# Patient Record
Sex: FEMALE | Race: WHITE | NOT HISPANIC OR LATINO | Employment: OTHER | URBAN - METROPOLITAN AREA
[De-identification: names, ages, dates, MRNs, and addresses within clinical notes are randomized per-mention and may not be internally consistent; named-entity substitution may affect disease eponyms.]

---

## 2021-02-24 ENCOUNTER — IMMUNIZATION (OUTPATIENT)
Dept: OBSTETRICS AND GYNECOLOGY | Facility: CLINIC | Age: 66
End: 2021-02-24
Payer: MEDICARE

## 2021-02-24 DIAGNOSIS — Z23 NEED FOR VACCINATION: Primary | ICD-10-CM

## 2021-02-24 PROCEDURE — 91300 COVID-19, MRNA, LNP-S, PF, 30 MCG/0.3 ML DOSE VACCINE: CPT | Mod: PBBFAC | Performed by: NURSE PRACTITIONER

## 2021-03-17 ENCOUNTER — IMMUNIZATION (OUTPATIENT)
Dept: OBSTETRICS AND GYNECOLOGY | Facility: CLINIC | Age: 66
End: 2021-03-17
Payer: MEDICARE

## 2021-03-17 DIAGNOSIS — Z23 NEED FOR VACCINATION: Primary | ICD-10-CM

## 2021-03-17 PROCEDURE — 91300 COVID-19, MRNA, LNP-S, PF, 30 MCG/0.3 ML DOSE VACCINE: CPT | Mod: PBBFAC | Performed by: FAMILY MEDICINE

## 2021-03-17 PROCEDURE — 0002A COVID-19, MRNA, LNP-S, PF, 30 MCG/0.3 ML DOSE VACCINE: CPT | Mod: PBBFAC | Performed by: FAMILY MEDICINE

## 2024-11-14 ENCOUNTER — TELEPHONE (OUTPATIENT)
Dept: NEUROLOGY | Facility: CLINIC | Age: 69
End: 2024-11-14
Payer: MEDICARE

## 2024-11-14 NOTE — TELEPHONE ENCOUNTER
----- Message from Sonia sent at 11/14/2024  3:24 PM CST -----  Regarding: appt  Contact: [phone]  ..Pt requesting NP appt type. Pt returning call from staff in regards to scheduling.  Pls call to better discuss.

## 2024-11-14 NOTE — TELEPHONE ENCOUNTER
----- Message from Anita sent at 11/14/2024  9:54 AM CST -----  Type:  Sooner Apoointment Request    Caller is requesting a sooner appointment.  Caller declined first available appointment listed below.  Caller will not accept being placed on the waitlist and is requesting a message be sent to doctor.  Name of Caller: Pt  When is the first available appointment?  Symptoms: referral, Parkinson  Would the patient rather a call back or a response via [facility]? Call  Best Call Back Number:  [phone]  Additional Information: Pt would like to speak to someone in office for an appt.

## 2024-11-19 ENCOUNTER — TELEPHONE (OUTPATIENT)
Dept: NEUROLOGY | Facility: CLINIC | Age: 69
End: 2024-11-19
Payer: MEDICARE

## 2025-03-17 ENCOUNTER — LAB VISIT (OUTPATIENT)
Dept: LAB | Facility: HOSPITAL | Age: 70
End: 2025-03-17
Attending: STUDENT IN AN ORGANIZED HEALTH CARE EDUCATION/TRAINING PROGRAM
Payer: MEDICARE

## 2025-03-17 ENCOUNTER — OFFICE VISIT (OUTPATIENT)
Facility: CLINIC | Age: 70
End: 2025-03-17
Payer: MEDICARE

## 2025-03-17 ENCOUNTER — RESULTS FOLLOW-UP (OUTPATIENT)
Facility: CLINIC | Age: 70
End: 2025-03-17

## 2025-03-17 VITALS
HEIGHT: 68 IN | BODY MASS INDEX: 25.61 KG/M2 | DIASTOLIC BLOOD PRESSURE: 85 MMHG | WEIGHT: 169 LBS | HEART RATE: 53 BPM | SYSTOLIC BLOOD PRESSURE: 132 MMHG

## 2025-03-17 DIAGNOSIS — R26.9 ABNORMAL GAIT: ICD-10-CM

## 2025-03-17 DIAGNOSIS — E61.1 IRON DEFICIENCY: ICD-10-CM

## 2025-03-17 DIAGNOSIS — E61.1 IRON DEFICIENCY ASSOCIATED WITH FAMILIAL RESTLESS LEGS SYNDROME: ICD-10-CM

## 2025-03-17 DIAGNOSIS — G25.81 FAMILIAL RESTLESS LEGS SYNDROME: ICD-10-CM

## 2025-03-17 DIAGNOSIS — E53.8 B12 DEFICIENCY: ICD-10-CM

## 2025-03-17 DIAGNOSIS — G25.81 IRON DEFICIENCY ASSOCIATED WITH FAMILIAL RESTLESS LEGS SYNDROME: ICD-10-CM

## 2025-03-17 DIAGNOSIS — F32.0 CURRENT MILD EPISODE OF MAJOR DEPRESSIVE DISORDER WITHOUT PRIOR EPISODE: ICD-10-CM

## 2025-03-17 DIAGNOSIS — N39.46 MIXED STRESS AND URGE URINARY INCONTINENCE: ICD-10-CM

## 2025-03-17 DIAGNOSIS — G20.A1 PARKINSON'S DISEASE WITHOUT DYSKINESIA OR FLUCTUATING MANIFESTATIONS: Primary | ICD-10-CM

## 2025-03-17 LAB
FERRITIN SERPL-MCNC: 44 NG/ML (ref 20–300)
FOLATE SERPL-MCNC: 16.4 NG/ML (ref 4–24)
IRON SERPL-MCNC: 72 UG/DL (ref 30–160)
SATURATED IRON: 18 % (ref 20–50)
TOTAL IRON BINDING CAPACITY: 391 UG/DL (ref 250–450)
TRANSFERRIN SERPL-MCNC: 264 MG/DL (ref 200–375)

## 2025-03-17 PROCEDURE — 99204 OFFICE O/P NEW MOD 45 MIN: CPT | Mod: S$PBB,,, | Performed by: STUDENT IN AN ORGANIZED HEALTH CARE EDUCATION/TRAINING PROGRAM

## 2025-03-17 PROCEDURE — 99213 OFFICE O/P EST LOW 20 MIN: CPT | Mod: PBBFAC | Performed by: STUDENT IN AN ORGANIZED HEALTH CARE EDUCATION/TRAINING PROGRAM

## 2025-03-17 PROCEDURE — 82746 ASSAY OF FOLIC ACID SERUM: CPT | Performed by: STUDENT IN AN ORGANIZED HEALTH CARE EDUCATION/TRAINING PROGRAM

## 2025-03-17 PROCEDURE — 84466 ASSAY OF TRANSFERRIN: CPT | Performed by: STUDENT IN AN ORGANIZED HEALTH CARE EDUCATION/TRAINING PROGRAM

## 2025-03-17 PROCEDURE — 82728 ASSAY OF FERRITIN: CPT | Performed by: STUDENT IN AN ORGANIZED HEALTH CARE EDUCATION/TRAINING PROGRAM

## 2025-03-17 PROCEDURE — 84425 ASSAY OF VITAMIN B-1: CPT | Performed by: STUDENT IN AN ORGANIZED HEALTH CARE EDUCATION/TRAINING PROGRAM

## 2025-03-17 PROCEDURE — 83921 ORGANIC ACID SINGLE QUANT: CPT | Performed by: STUDENT IN AN ORGANIZED HEALTH CARE EDUCATION/TRAINING PROGRAM

## 2025-03-17 PROCEDURE — G2211 COMPLEX E/M VISIT ADD ON: HCPCS | Mod: S$PBB,,, | Performed by: STUDENT IN AN ORGANIZED HEALTH CARE EDUCATION/TRAINING PROGRAM

## 2025-03-17 PROCEDURE — 99999 PR PBB SHADOW E&M-EST. PATIENT-LVL III: CPT | Mod: PBBFAC,,, | Performed by: STUDENT IN AN ORGANIZED HEALTH CARE EDUCATION/TRAINING PROGRAM

## 2025-03-17 RX ORDER — CARBIDOPA AND LEVODOPA 25; 100 MG/1; MG/1
1 TABLET ORAL 3 TIMES DAILY
Qty: 270 TABLET | Refills: 3 | Status: SHIPPED | OUTPATIENT
Start: 2025-03-17 | End: 2026-03-17

## 2025-03-17 RX ORDER — SERTRALINE HYDROCHLORIDE 25 MG/1
25 TABLET, FILM COATED ORAL DAILY
Qty: 90 TABLET | Refills: 3 | Status: SHIPPED | OUTPATIENT
Start: 2025-03-17 | End: 2026-03-17

## 2025-03-17 RX ORDER — ROPINIROLE HYDROCHLORIDE 2 MG/1
2 TABLET, FILM COATED, EXTENDED RELEASE ORAL NIGHTLY
Qty: 90 TABLET | Refills: 3 | Status: SHIPPED | OUTPATIENT
Start: 2025-03-17 | End: 2026-03-17

## 2025-03-17 RX ORDER — BUPROPION HYDROCHLORIDE 150 MG/1
150 TABLET ORAL DAILY
Qty: 90 TABLET | Refills: 3 | Status: SHIPPED | OUTPATIENT
Start: 2025-03-17 | End: 2026-03-17

## 2025-03-17 NOTE — PATIENT INSTRUCTIONS
Join [facility] and then let me know what email you sign up with and if they give you a code so that I can send your Carbidopa prescription over.

## 2025-03-17 NOTE — PROGRESS NOTES
Name: Sandy Murray  MRN: [id number]   CSN: [id number]      Date: 03/17/2025    Referring physician:  Self/Dr. Cooper (Fax: [phone])    Chief Complaint / Interval History: PD      History of Present Illness (HPI):    Ms. Murray is a 68 yo RH woman who presents to establish care for PD.     She is orginially from [place] but came [place] the [place] after meeting her SO in [place]. Worked in the[employer]     Her symptom onset a was about 2  years ago with tremor in her left hand. She also noticed stiffness and slowness with her left side. Her walking is a bit asymmetric and slower with a tendency to drag her left leg some. Some mild start hesistation. She also developed significant depression around this time.     She is very active and eats an entirely plant based diet.     At her last neurology appt in April 2024 she was started on Sinemet 1 tab TID. She has required carbidopa for nausea.     RLS are under control with ropinirole.    Current Mvmt Medications:  Sinemet 1 tab TID (7am/1:30pm/7:30pm)  - bedtime around 9-9:30   - Working well without wearing off or fluctuations.   Carbidopa 25mg TID   Buproprion 150mg   Ropinirole ER 2mg     Prior Mvmt Medication Trials:  -    Nonmotor ROS:  Smell/Taste: anosmia   Voice/Swallowing: voice is ok, no dysphagia   Gait/Falls: no falling, has to concentrate on walking  - have done LSVT and did well with this. Last done about 6 months ago.  Exercise: very active, rides bike and does exercises   Dizziness: dizzy constantly but no passing out - blood pressures are ok  Hydration: could do better   Urinary Issues: feels like she constantly has to pee   Constipation: chronic, having 3 BM/week  - takes a laxative as needed  Sleep/RBD: no active dreams   Hallucinations/Peripheral Illusions: none   Memory/Cognition/Language: focus is poor   - still independent  Mood: on wellbutrin for depression     DA ROS:  ICD Symptoms: none   EDS: none  LE Swelling: ankle swelling     Past  "Medical History: PD, Depression, RLS     Relevant Surgical History: Lumbar spine surgery.     Social History: Occasional ETOH, no tobacco or drug use.     Family History: Their family history is not on file. No family history.     Allergies: Patient has no allergy information on record.     Meds: Medications Ordered Prior to Encounter[1]    Exam:  /85   Pulse (!) 53   Ht 5' 8" (1.727 m)   Wt 76.7 kg (169 lb)   BMI 25.70 kg/m²     Constitutional  Well-developed, well-nourished, appears stated age   Cardiovascular  No LE edema bilaterally   Neurological    * Mental status  MOCA = not done during today's visit     - Orientation  Oriented to conversation     - Memory   Intact recent and remote     - Attention/concentration  Attentive, vigilant during exam     - Language  Intact to conversation.     - Fund of knowledge  Aware of current events     - Executive  Well-organized thoughts     - Other     * Cranial nerves       - CN II  Pupils equal, visual fields full to confrontation     - CN III, IV, VI  Extraocular movements full, normal pursuits and saccades         - CN VII  Face strong and symmetric bilaterally     - CN VIII  Hearing intact bilaterally         - CN XI  SCM and trapezius 5/5 bilaterally       * Motor  Muscle bulk normal, strength 5/5 throughout   * Sensory   Intact to light touch and vibration throughout   * Coordination  No dysmetria with finger-to-nose  Mildly + romberg   * Gait  See below.   * Deep tendon reflexes  2+ and symmetric throughout      * Specialized movement exam Gen: masked facies and reduced blink   Speech: hypophonic  Tremor: left hand rest tremor, intermittent right hand rest tremor as well   Bradykinesia: meryl with decrement on the left   Tone: slightly increased on the left   Gait: good arm swing, good pivot, slightly off balance      Medical Record Review:  Labs, imaging and prior notes reviewed independently.       Diagnoses:          1. Parkinson's disease without " dyskinesia or fluctuating manifestations        2. Familial restless legs syndrome  Ferritin    Iron and TIBC      3. B12 deficiency  Vitamin B12 Deficiency Panel      4. Mixed stress and urge urinary incontinence  Ambulatory consult to Urology      5. Current mild episode of major depressive disorder without prior episode  sertraline (ZOLOFT) 25 MG tablet      6. Abnormal gait  Vitamin B12 Deficiency Panel    Folate    Vitamin B1      7. Iron deficiency  Ferritin      8. Iron deficiency associated with familial restless legs syndrome  Iron and TIBC          Assessment:  Ms. Murray is a 68 yo RH woman with RLS and parkinsonism (L>R - tremor and bradykinesia) and new onset depression. She has responded favorably to Sinemet and Buproprion. Also reports some memory concerns but remains independent.     Plan:  - Continue Sinemet 1 tab TID. Would have a low threshold to increase this to 1.5 tabs TID in the future given the amount of bradykinesia on her exam today. I have instructed her to look at [facility] for Carbidopa due to price.   - For depression - continue Wellbutrin. I will add low dose Zoloft 25mg today. Consider psychiatry in the future if depression continues to be an issue.   - For constipation - goal 3 BM/week.   - Continue active lifestyle.   - I will check nutritional labs today as she is vegetarian and stopped vitamin supplementation when she started PD medications.   - For RLS, continue Ropinirole ER 2mg nightly for now. No augmentation or SE currently. Checking iron levels today.   - Memory changes- continue to monitor. Treat depression and look for nutritional deficieny first.   - For urinary incontinence - urology consult placed.     The visit today is associated with current or anticipated ongoing medical care related to this patients complex condition. Patient should RTC in 3-4 months to see me.     Total time: 45 minutes spent on the encounter, which includes face to face time and non-face to face  time preparing to see the patient (eg, review of tests), Obtaining and/or reviewing separately obtained history, Documenting clinical information in the electronic or other health record, Independently interpreting results (not separately reported) and communicating results to the patient/family/caregiver, or Care coordination (not separately reported).     Alma Charles MD  Division of Movement and Memory Disorders  [facility]  [phone]         [1]   No current outpatient medications on file prior to visit.     No current facility-administered medications on file prior to visit.

## 2025-03-18 ENCOUNTER — PATIENT MESSAGE (OUTPATIENT)
Facility: CLINIC | Age: 70
End: 2025-03-18
Payer: MEDICARE

## 2025-03-18 LAB — VIT B12 SERPL-MCNC: 326 NG/L (ref 180–914)

## 2025-03-19 RX ORDER — CARBIDOPA 25 MG/1
25 TABLET ORAL 3 TIMES DAILY
Qty: 270 TABLET | Refills: 3 | Status: SHIPPED | OUTPATIENT
Start: 2025-03-19 | End: 2026-03-19

## 2025-03-20 LAB — METHYLMALONATE SERPL-SCNC: 0.13 NMOL/ML

## 2025-03-21 LAB — VIT B1 BLD-MCNC: 56 UG/L (ref 38–122)

## 2025-03-24 ENCOUNTER — TELEPHONE (OUTPATIENT)
Facility: CLINIC | Age: 70
End: 2025-03-24
Payer: MEDICARE

## 2025-03-24 NOTE — TELEPHONE ENCOUNTER
----- Message from Alma Charles MD sent at 3/17/2025  9:24 AM CDT -----  Please schedule with multi-D PD clinic next available with me.

## 2025-04-08 ENCOUNTER — TELEPHONE (OUTPATIENT)
Facility: CLINIC | Age: 70
End: 2025-04-08
Payer: MEDICARE

## 2025-04-08 NOTE — TELEPHONE ENCOUNTER
----- Message from Shanika sent at 4/8/2025  9:07 AM CDT -----  Regarding: missed call  Pt is returning a missed call from someone in the office and is asking for a return call back soon. Thanks. Who Called: Brooke ( from last month ) Patient requesting call back or [facility] Call back   ADMIT

## 2025-04-22 RX ORDER — ROPINIROLE HYDROCHLORIDE 2 MG/1
2 TABLET, FILM COATED, EXTENDED RELEASE ORAL NIGHTLY
Qty: 90 TABLET | Refills: 3 | Status: SHIPPED | OUTPATIENT
Start: 2025-04-22 | End: 2026-04-22

## 2025-04-23 RX ORDER — ROPINIROLE HYDROCHLORIDE 2 MG/1
2 TABLET, FILM COATED, EXTENDED RELEASE ORAL NIGHTLY
Qty: 90 TABLET | Refills: 3 | Status: SHIPPED | OUTPATIENT
Start: 2025-04-23 | End: 2026-04-23

## 2025-05-20 ENCOUNTER — PATIENT MESSAGE (OUTPATIENT)
Dept: UROLOGY | Facility: CLINIC | Age: 70
End: 2025-05-20
Payer: MEDICARE

## 2025-05-27 ENCOUNTER — OFFICE VISIT (OUTPATIENT)
Dept: UROLOGY | Facility: CLINIC | Age: 70
End: 2025-05-27
Payer: MEDICARE

## 2025-05-27 VITALS
SYSTOLIC BLOOD PRESSURE: 111 MMHG | BODY MASS INDEX: 25.53 KG/M2 | DIASTOLIC BLOOD PRESSURE: 75 MMHG | HEIGHT: 68 IN | HEART RATE: 65 BPM | WEIGHT: 168.44 LBS

## 2025-05-27 DIAGNOSIS — N95.8 GENITOURINARY SYNDROME OF MENOPAUSE: Primary | ICD-10-CM

## 2025-05-27 DIAGNOSIS — G20.A1 PARKINSON'S DISEASE WITHOUT DYSKINESIA OR FLUCTUATING MANIFESTATIONS: ICD-10-CM

## 2025-05-27 DIAGNOSIS — N39.46 MIXED STRESS AND URGE URINARY INCONTINENCE: ICD-10-CM

## 2025-05-27 PROCEDURE — 81002 URINALYSIS NONAUTO W/O SCOPE: CPT | Mod: PBBFAC | Performed by: UROLOGY

## 2025-05-27 PROCEDURE — 99999PBSHW PR PBB SHADOW TECHNICAL ONLY FILED TO HB: Mod: PBBFAC,,,

## 2025-05-27 PROCEDURE — 99213 OFFICE O/P EST LOW 20 MIN: CPT | Mod: PBBFAC | Performed by: UROLOGY

## 2025-05-27 PROCEDURE — 51701 INSERT BLADDER CATHETER: CPT | Mod: PBBFAC | Performed by: UROLOGY

## 2025-05-27 PROCEDURE — 99999 PR PBB SHADOW E&M-EST. PATIENT-LVL III: CPT | Mod: PBBFAC,,, | Performed by: UROLOGY

## 2025-05-27 RX ORDER — ESTRADIOL 0.1 MG/G
1 CREAM VAGINAL
Qty: 42.5 G | Refills: 3 | Status: SHIPPED | OUTPATIENT
Start: 2025-05-28 | End: 2026-05-28

## 2025-05-27 NOTE — PROGRESS NOTES
History of Present Illness    CHIEF COMPLAINT:  Ms. Sandy Murray presents today for evaluation of urinary incontinence.    URINARY SYMPTOMS:  Mrs. Murray is a 69 year old woman with history of Parkinson's disease.    She experiences nocturia, waking up at least 3 times per night to urinate, with minimal daytime urination. She had an episode of nocturnal enuresis approximately 1.5 months ago. She reports urinary urgency with incontinence upon returning home. She denies difficulty initiating urination, hematuria, and history of urinary tract infections.    MEDICAL HISTORY:  She was diagnosed with Parkinson's disease 1.5 years ago.    GASTROINTESTINAL:  She reports constipation since starting Parkinson's medications, which she manages with prunes and diet modifications. Her symptoms are well-controlled at home but worsen during work-related travel.    OBSTETRIC/GYNECOLOGIC HISTORY:  . She has an intact uterus, not sexually active ()     SOCIAL HISTORY:  She is originally from [facility].  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  She travels to trade shows.       ROS:  General: -fever, -chills, -fatigue, -weight gain, -weight loss  Eyes: -vision changes, -redness, -discharge  ENT: -ear pain, -nasal congestion, -sore throat  Cardiovascular: -chest pain, -palpitations, -lower extremity edema  Respiratory: -cough, -shortness of breath  Gastrointestinal: -abdominal pain, -nausea, -vomiting, -diarrhea, +constipation, -blood in stool  Genitourinary: -dysuria, -hematuria, +frequency, +nocturia, +hesitancy, +difficulty urinating, +urinary incontinence  Musculoskeletal: -joint pain, -muscle pain  Skin: -rash, -lesion  Neurological: -headache, -dizziness, -numbness, -tingling  Psychiatric: -anxiety, -depression, -sleep difficulty             Past Medical History:   Diagnosis Date    Parkinson's disease        No past surgical history on file.    No family history on file.    Social  "History[1]    Allergies:  Patient has no known allergies.    Medications:  Encounter Medications[2]      PHYSICAL EXAMINATION:    The patient generally appears in good health, is appropriately interactive, and is in no apparent distress.    Skin: No lesions.    Mental: Cooperative with normal affect.    Neuro: Grossly intact.    HEENT: Normal. No evidence of lymphadenopathy.    Chest:  normal inspiratory effort.    Abdomen: Soft, non-tender. No masses or organomegaly. Bladder is not palpable. No evidence of flank discomfort. No evidence of inguinal hernia.    Extremities: No clubbing, cyanosis, or edema    NOTE:  the exam was carried out with a nurse chaperone present  Normal external female genitalia  Urethral meatus is normal  Urethra and bladder are nontender to bimanual exam  Well supported anteriorly and posteriorly   Uterus and cervix are normal  No adnexal masses  PVR by catheterization was 90 ml    LABS:    No results found for: "BUN", "CREATININE"    UA 1.010, pH 5.5, otherwise, negative.       Assessment & Plan    G20.C Parkinsonism, unspecified  N39.44 Nocturnal enuresis  N95.8 Genitourinary syndrome of menopause (GSM)  K59.03 Drug induced constipation    GENITOURINARY SYNDROME OF MENOPAUSE:  - Considered Genitourinary syndrome of menopause (GSM) as potential contributor to urinary symptoms, using diagnosis code "genitourinary syndrome of menopause".  - Started Estrace vaginal cream for genitourinary syndrome of menopause, to be applied 3 times per week using an applicator.  - Discussed that vaginal cream for atrophy, despite alarming package inserts, is safe when used vaginally due to small dose.    PARKINSONISM:  - Assessed potential Parkinson's-related voiding issues, noting classical description of slow sphincter opening leading to incomplete bladder emptying.  - Explained physiological process of normal urination and how Parkinson's disease can affect this process.    OVERACTIVE BLADDER:  - Evaluated " possibility of overactive bladder as additional factor in incontinence.    GENERAL MANAGEMENT:  - Initiated conservative approach, starting with vaginal cream treatment before considering further workup for incontinence.  - Contact office through [employer] if prescribed cream is too expensive at pharmacy.    FOLLOW-UP:  - Follow up in 3 months for reassessment.  Consider for UDS.     I spent a total of 45 minutes on the day of the visit.  This includes face to face time and non-face to face time preparing to see the patient (eg, review of tests), obtaining and/or reviewing separately obtained history, documenting clinical information in the electronic or other health record, independently interpreting results and communicating results to the patient/family/caregiver, or care coordinator.    Visit complexity today is associated with medical care services that are part of the ongoing care related to the single serious and/or complex condition of Overactive bladder (OAB) and Genitourinary syndrome of menopause (GSM). A longitudinal relationship exists or is being developed between the patient and this practitioner for the care of this condition.        This note was generated with the assistance of ambient listening technology. Verbal consent was obtained by the patient and accompanying visitor(s) for the recording of patient appointment to facilitate this note. I attest to having reviewed and edited the generated note for accuracy, though some syntax or spelling errors may persist. Please contact the author of this note for any clarification.             [1]   Social History  Socioeconomic History    Marital status:    Tobacco Use    Smoking status: Never    Smokeless tobacco: Never     Social Drivers of Health     Financial Resource Strain: Medium Risk (3/17/2025)    Overall Financial Resource Strain (CARDIA)     Difficulty of Paying Living Expenses: Somewhat hard   Food Insecurity: No Food Insecurity  (3/17/2025)    Hunger Vital Sign     Worried About Running Out of Food in the Last Year: Never true     Ran Out of Food in the Last Year: Never true   Transportation Needs: No Transportation Needs (3/17/2025)    PRAPARE - Transportation     Lack of Transportation (Medical): No     Lack of Transportation (Non-Medical): No   Physical Activity: Insufficiently Active (3/17/2025)    Exercise Vital Sign     Days of Exercise per Week: 7 days     Minutes of Exercise per Session: 20 min   Stress: Stress Concern Present (3/17/2025)    Citizen of [place] of [employer] - Occupational Stress Questionnaire     Feeling of Stress : Rather much   Housing Stability: Low Risk  (3/17/2025)    Housing Stability Vital Sign     Unable to Pay for Housing in the Last Year: No     Homeless in the Last Year: No   [2]   Outpatient Encounter Medications as of 5/27/2025   Medication Sig Dispense Refill    buPROPion (WELLBUTRIN XL) 150 MG TB24 tablet Take 1 tablet (150 mg total) by mouth once daily. 90 tablet 3    carbidopa (LODOSYN) 25 mg tablet Take 1 tablet (25 mg total) by mouth 3 (three) times daily. 270 tablet 3    carbidopa-levodopa  mg (SINEMET)  mg per tablet Take 1 tablet by mouth 3 (three) times daily. 270 tablet 3    rOPINIRole (REQUIP XL) 2 mg 24 hr tablet Take 1 tablet (2 mg total) by mouth nightly. 90 tablet 3    rOPINIRole (REQUIP XL) 2 mg 24 hr tablet Take 1 tablet (2 mg total) by mouth nightly. 90 tablet 3    sertraline (ZOLOFT) 25 MG tablet Take 1 tablet (25 mg total) by mouth once daily. 90 tablet 3    [START ON 5/28/2025] estradioL (ESTRACE) 0.01 % (0.1 mg/gram) vaginal cream Place 1 g vaginally 3 (three) times a week. 42.5 g 3     No facility-administered encounter medications on file as of 5/27/2025.

## 2025-05-28 LAB
BILIRUBIN, POC UA: NEGATIVE
BLOOD, POC UA: NEGATIVE
CLARITY, UA: CLEAR
COLOR, UA: YELLOW
GLUCOSE, POC UA: NEGATIVE
KETONES, POC UA: NEGATIVE
LEUKOCYTE EST, POC UA: NEGATIVE
NITRITE, POC UA: NEGATIVE
PH UR STRIP: 5.5 [PH] (ref 5–8)
PROTEIN, POC UA: NEGATIVE
SPECIFIC GRAVITY, POC UA: 1.01 (ref 1–1.03)
UROBILINOGEN, POC UA: 0.2 E.U./DL

## 2025-06-10 ENCOUNTER — OFFICE VISIT (OUTPATIENT)
Facility: CLINIC | Age: 70
End: 2025-06-10
Payer: MEDICARE

## 2025-06-10 VITALS
HEART RATE: 61 BPM | BODY MASS INDEX: 25.61 KG/M2 | WEIGHT: 169 LBS | DIASTOLIC BLOOD PRESSURE: 84 MMHG | HEIGHT: 68 IN | SYSTOLIC BLOOD PRESSURE: 131 MMHG

## 2025-06-10 DIAGNOSIS — G20.A1 PARKINSON'S DISEASE WITHOUT DYSKINESIA OR FLUCTUATING MANIFESTATIONS: Primary | ICD-10-CM

## 2025-06-10 DIAGNOSIS — E61.1 IRON DEFICIENCY ASSOCIATED WITH FAMILIAL RESTLESS LEGS SYNDROME: ICD-10-CM

## 2025-06-10 DIAGNOSIS — N39.46 MIXED STRESS AND URGE URINARY INCONTINENCE: ICD-10-CM

## 2025-06-10 DIAGNOSIS — R41.89 COGNITIVE IMPAIRMENT: ICD-10-CM

## 2025-06-10 DIAGNOSIS — R41.3 OTHER AMNESIA: ICD-10-CM

## 2025-06-10 DIAGNOSIS — F32.0 CURRENT MILD EPISODE OF MAJOR DEPRESSIVE DISORDER WITHOUT PRIOR EPISODE: ICD-10-CM

## 2025-06-10 DIAGNOSIS — G25.81 IRON DEFICIENCY ASSOCIATED WITH FAMILIAL RESTLESS LEGS SYNDROME: ICD-10-CM

## 2025-06-10 PROCEDURE — 99999 PR PBB SHADOW E&M-EST. PATIENT-LVL III: CPT | Mod: PBBFAC,,, | Performed by: STUDENT IN AN ORGANIZED HEALTH CARE EDUCATION/TRAINING PROGRAM

## 2025-06-10 PROCEDURE — 96116 NUBHVL XM PHYS/QHP 1ST HR: CPT | Mod: PBBFAC | Performed by: STUDENT IN AN ORGANIZED HEALTH CARE EDUCATION/TRAINING PROGRAM

## 2025-06-10 PROCEDURE — 99213 OFFICE O/P EST LOW 20 MIN: CPT | Mod: PBBFAC | Performed by: STUDENT IN AN ORGANIZED HEALTH CARE EDUCATION/TRAINING PROGRAM

## 2025-06-10 NOTE — PROGRESS NOTES
Name: Sandy Murray  MRN: [id number]   CSN: [id number]      Date: 06/10/2025    Referring physician:  Self/Dr. Cooper (Fax: [phone])    Chief Complaint / Interval History: PD      History of Present Illness (HPI):    Ms. Murray is a 70 yo RH woman who presents to establish care for PD.     She is orginially from [place] but came to the  after meeting her SO in [place]. Worked in the[employer]     Her symptom onset a was about 2  years ago with tremor in her left hand. She also noticed stiffness and slowness with her left side. Her walking is a bit asymmetric and slower with a tendency to drag her left leg some. Some mild start hesistation. She also developed significant depression around this time.     She is very active and eats an entirely plant based diet.     At her last neurology appt in April 2024 she was started on Sinemet 1 tab TID. She has required carbidopa for nausea.     RLS are under control with ropinirole.    Interval History:  Ms. Murray presents for follow-up.   Her biggest concern today is forgetfulness. Her mother had dementia so this is a big source of stress for her.  She states that at times she feels disoriented even in her own home.   As for her PD - her tremors do breakthrough at times.   Sleeping is ok.   Zoloft 25mg was started at her last visit but she feels that her anxiety is still problematic.   MOCA today 25/30 (see below).    Current Mvmt Medications:  Sinemet 1 tab TID (7am/1:30pm/7:30pm)  - bedtime around 9-9:30   - Working well without wearing off or fluctuations.   Carbidopa 25mg TID   Buproprion 150mg   Ropinirole ER 2mg     Prior Mvmt Medication Trials:  -    Nonmotor ROS:  Smell/Taste: anosmia   Voice/Swallowing: voice is ok, sometimes will choke more often   - monitor for now  Gait/Falls: no falling, has to concentrate on walking  - have done LSVT and did well with this. Last done about 6 months ago.  Exercise: very active, rides bike and does exercises  "  Dizziness: dizzy constantly but no passing out - blood pressures are ok  Hydration: could do better   Urinary Issues: feels like she constantly has to pee   Constipation: chronic, having 3 BM/week  - takes a laxative as needed  Sleep/RBD: no active dreams   Hallucinations/Peripheral Illusions: none   Memory/Cognition/Language: worsening   - still independent  Mood: on wellbutrin for depression     DA ROS:  ICD Symptoms: none   EDS: none  LE Swelling: ankle swelling     Past Medical History: PD, Depression, RLS     Relevant Surgical History: Lumbar spine surgery.     Social History: Occasional ETOH, no tobacco or drug use.     Family History: Their family history is not on file. No family history.     Allergies: Patient has no known allergies.     Meds: Medications Ordered Prior to Encounter[1]    Exam:  /84   Pulse 61   Ht 5' 8" (1.727 m)   Wt 76.7 kg (169 lb)   BMI 25.70 kg/m²     Constitutional  Well-developed, well-nourished, appears stated age   Cardiovascular  No LE edema bilaterally   Neurological    * Mental status  MOCA = 25/30 (see below)     - Orientation  Oriented to conversation     - Memory   Intact recent and remote     - Attention/concentration  Attentive, vigilant during exam     - Language  Intact to conversation.     - Fund of knowledge  Aware of current events     - Executive  Well-organized thoughts     - Other     * Cranial nerves       - CN II  Pupils equal, visual fields full to confrontation     - CN III, IV, VI  Extraocular movements full, normal pursuits and saccades         - CN VII  Face strong and symmetric bilaterally     - CN VIII  Hearing intact bilaterally         - CN XI  SCM and trapezius 5/5 bilaterally       * Motor  Muscle bulk normal, strength 5/5 throughout   * Sensory   Intact to light touch and vibration throughout   * Coordination  No dysmetria with finger-to-nose  Mildly + romberg   * Gait  See below.   * Deep tendon reflexes  2+ and symmetric throughout      * " Specialized movement exam Gen: masked facies and reduced blink   Speech: hypophonic  Tremor: left hand rest tremor, intermittent right hand rest tremor as well   Bradykinesia: meryl with decrement on the left   Tone: slightly increased on the left   Gait: good arm swing, good pivot, slightly off balance                Medical Record Review:  Labs, imaging and prior notes reviewed independently.       Diagnoses:          1. Parkinson's disease without dyskinesia or fluctuating manifestations        2. Other amnesia  MRI Brain Without Contrast      3. Cognitive impairment  [id number], Plasma    Neurofilament Light Chain    Ambulatory referral/consult to Adult Neuropsychology      4. Iron deficiency associated with familial restless legs syndrome        5. Mixed stress and urge urinary incontinence        6. Current mild episode of major depressive disorder without prior episode              Assessment:  Ms. Murray is a 68 yo RH woman with RLS and parkinsonism (L>R - tremor and bradykinesia) and new onset depression. She has responded favorably to Sinemet and Buproprion. Also reports some memory concerns that seem to be worsening.     Plan:  - Continue Sinemet 1 tab TID. Would have a low threshold to increase this to 1.5 tabs TID in the future given the amount of bradykinesia on her exam today. Continue Carbidopa via [facility].   - For depression - continue Wellbutrin. Continue Zoloft 25mg for now but likely needs an increase. Consider psychiatry in the future if depression continues to be an issue.   - For constipation - goal 3 BM/week.   - Continue active lifestyle.   - Nutritional labs unremarkable.   - For RLS, continue Ropinirole ER 2mg nightly for now. No augmentation or SE currently. Iron levels low - now supplementing  - Memory changes- MOCA 25/30 today. I will order NPT,  MRI and ND labs.   - For urinary incontinence - urology consult placed.   - Continue to monitor dysphagia.     The visit today is associated  with current or anticipated ongoing medical care related to this patients complex condition. Patient should RTC in 3-4 months to see me.     Alma Charles MD  Division of Movement and Memory Disorders  [facility]  [phone]           [1]   Current Outpatient Medications on File Prior to Visit   Medication Sig Dispense Refill    buPROPion (WELLBUTRIN XL) 150 MG TB24 tablet Take 1 tablet (150 mg total) by mouth once daily. 90 tablet 3    carbidopa (LODOSYN) 25 mg tablet Take 1 tablet (25 mg total) by mouth 3 (three) times daily. 270 tablet 3    carbidopa-levodopa  mg (SINEMET)  mg per tablet Take 1 tablet by mouth 3 (three) times daily. 270 tablet 3    estradioL (ESTRACE) 0.01 % (0.1 mg/gram) vaginal cream Place 1 g vaginally 3 (three) times a week. 42.5 g 3    rOPINIRole (REQUIP XL) 2 mg 24 hr tablet Take 1 tablet (2 mg total) by mouth nightly. 90 tablet 3    rOPINIRole (REQUIP XL) 2 mg 24 hr tablet Take 1 tablet (2 mg total) by mouth nightly. 90 tablet 3    sertraline (ZOLOFT) 25 MG tablet Take 1 tablet (25 mg total) by mouth once daily. 90 tablet 3     No current facility-administered medications on file prior to visit.

## 2025-06-12 ENCOUNTER — PATIENT MESSAGE (OUTPATIENT)
Facility: CLINIC | Age: 70
End: 2025-06-12
Payer: MEDICARE

## 2025-06-12 DIAGNOSIS — G20.A1 PARKINSON'S DISEASE WITHOUT DYSKINESIA OR FLUCTUATING MANIFESTATIONS: ICD-10-CM

## 2025-06-12 DIAGNOSIS — R41.3 OTHER AMNESIA: Primary | ICD-10-CM

## 2025-06-16 NOTE — TELEPHONE ENCOUNTER
Added new order for MRI to include presence of metal plate in spine. Attached new referral. Previous referral was approved automatically.   Messaged patient with same and that Dr. Charles thinks she will be fine to have MRI done.

## 2025-06-24 ENCOUNTER — HOSPITAL ENCOUNTER (OUTPATIENT)
Dept: RADIOLOGY | Facility: HOSPITAL | Age: 70
Discharge: HOME | End: 2025-06-24
Attending: STUDENT IN AN ORGANIZED HEALTH CARE EDUCATION/TRAINING PROGRAM
Payer: MEDICARE

## 2025-06-24 DIAGNOSIS — R41.3 OTHER AMNESIA: ICD-10-CM

## 2025-06-24 DIAGNOSIS — G20.A1 PARKINSON'S DISEASE WITHOUT DYSKINESIA OR FLUCTUATING MANIFESTATIONS: ICD-10-CM

## 2025-06-24 PROCEDURE — 70551 MRI BRAIN STEM W/O DYE: CPT | Mod: TC

## 2025-06-24 PROCEDURE — 70551 MRI BRAIN STEM W/O DYE: CPT | Mod: 26,,, | Performed by: RADIOLOGY

## 2025-06-26 ENCOUNTER — RESULTS FOLLOW-UP (OUTPATIENT)
Dept: NEUROLOGY | Facility: CLINIC | Age: 70
End: 2025-06-26

## 2025-07-28 DIAGNOSIS — G20.A1 PARKINSON'S DISEASE WITHOUT DYSKINESIA OR FLUCTUATING MANIFESTATIONS: Primary | ICD-10-CM
